# Patient Record
Sex: FEMALE | Race: WHITE | NOT HISPANIC OR LATINO | URBAN - METROPOLITAN AREA
[De-identification: names, ages, dates, MRNs, and addresses within clinical notes are randomized per-mention and may not be internally consistent; named-entity substitution may affect disease eponyms.]

---

## 2023-11-02 DIAGNOSIS — I10 ESSENTIAL HYPERTENSION: Primary | ICD-10-CM

## 2023-11-07 RX ORDER — TRIAMTERENE AND HYDROCHLOROTHIAZIDE 75; 50 MG/1; MG/1
1 TABLET ORAL
Qty: 90 TABLET | Refills: 1 | Status: SHIPPED | OUTPATIENT
Start: 2023-11-07

## 2024-04-05 DIAGNOSIS — Z76.0 MEDICATION REFILL: Primary | ICD-10-CM

## 2024-04-09 RX ORDER — BUSPIRONE HYDROCHLORIDE 15 MG/1
15 TABLET ORAL 2 TIMES DAILY
Qty: 180 TABLET | Refills: 0 | Status: SHIPPED | OUTPATIENT
Start: 2024-04-09

## 2024-05-02 DIAGNOSIS — I10 ESSENTIAL HYPERTENSION: ICD-10-CM

## 2024-05-02 RX ORDER — TRIAMTERENE AND HYDROCHLOROTHIAZIDE 75; 50 MG/1; MG/1
1 TABLET ORAL
Qty: 90 TABLET | Refills: 1 | OUTPATIENT
Start: 2024-05-02

## 2024-06-22 DIAGNOSIS — I10 ESSENTIAL HYPERTENSION: ICD-10-CM

## 2024-06-24 NOTE — TELEPHONE ENCOUNTER
Rx request received  Pharmacy populated  Last appmt 3/30/23 for physical. Call placed to patient to schedule annual physical. Patient scheduled for 7/2024

## 2024-06-25 RX ORDER — TRIAMTERENE AND HYDROCHLOROTHIAZIDE 75; 50 MG/1; MG/1
1 TABLET ORAL
Qty: 90 TABLET | Refills: 1 | Status: SHIPPED | OUTPATIENT
Start: 2024-06-25

## 2024-07-10 DIAGNOSIS — Z76.0 MEDICATION REFILL: ICD-10-CM

## 2024-07-10 RX ORDER — BUSPIRONE HYDROCHLORIDE 15 MG/1
15 TABLET ORAL 2 TIMES DAILY
Qty: 180 TABLET | Refills: 0 | Status: SHIPPED | OUTPATIENT
Start: 2024-07-10

## 2024-07-16 ENCOUNTER — APPOINTMENT (OUTPATIENT)
Dept: PRIMARY CARE | Facility: CLINIC | Age: 39
End: 2024-07-16

## 2024-07-22 ENCOUNTER — APPOINTMENT (OUTPATIENT)
Dept: PRIMARY CARE | Facility: CLINIC | Age: 39
End: 2024-07-22
Payer: COMMERCIAL

## 2024-08-12 ENCOUNTER — HOSPITAL ENCOUNTER (OUTPATIENT)
Dept: RADIOLOGY | Facility: HOSPITAL | Age: 39
Discharge: HOME | End: 2024-08-12
Payer: COMMERCIAL

## 2024-08-12 DIAGNOSIS — K50.919 CROHN'S DISEASE, UNSPECIFIED, WITH UNSPECIFIED COMPLICATIONS (MULTI): ICD-10-CM

## 2024-08-28 ENCOUNTER — OFFICE VISIT (OUTPATIENT)
Dept: PRIMARY CARE | Facility: CLINIC | Age: 39
End: 2024-08-28
Payer: COMMERCIAL

## 2024-08-28 VITALS
WEIGHT: 228.2 LBS | HEIGHT: 66 IN | OXYGEN SATURATION: 99 % | SYSTOLIC BLOOD PRESSURE: 130 MMHG | DIASTOLIC BLOOD PRESSURE: 88 MMHG | BODY MASS INDEX: 36.67 KG/M2 | HEART RATE: 79 BPM

## 2024-08-28 DIAGNOSIS — I10 PRIMARY HYPERTENSION: ICD-10-CM

## 2024-08-28 DIAGNOSIS — K50.80 CROHN'S DISEASE OF BOTH SMALL AND LARGE INTESTINE WITHOUT COMPLICATION (MULTI): ICD-10-CM

## 2024-08-28 DIAGNOSIS — Z00.00 ANNUAL PHYSICAL EXAM: Primary | ICD-10-CM

## 2024-08-28 PROBLEM — E55.9 VITAMIN D DEFICIENCY: Status: ACTIVE | Noted: 2024-08-28

## 2024-08-28 PROBLEM — G43.109 MIGRAINE WITH AURA AND WITHOUT STATUS MIGRAINOSUS, NOT INTRACTABLE: Status: ACTIVE | Noted: 2024-08-28

## 2024-08-28 PROBLEM — F41.9 ANXIETY: Status: ACTIVE | Noted: 2024-08-28

## 2024-08-28 PROCEDURE — 3075F SYST BP GE 130 - 139MM HG: CPT | Performed by: FAMILY MEDICINE

## 2024-08-28 PROCEDURE — 3079F DIAST BP 80-89 MM HG: CPT | Performed by: FAMILY MEDICINE

## 2024-08-28 PROCEDURE — 99395 PREV VISIT EST AGE 18-39: CPT | Performed by: FAMILY MEDICINE

## 2024-08-28 PROCEDURE — 1036F TOBACCO NON-USER: CPT | Performed by: FAMILY MEDICINE

## 2024-08-28 PROCEDURE — 3008F BODY MASS INDEX DOCD: CPT | Performed by: FAMILY MEDICINE

## 2024-08-28 RX ORDER — TRAMADOL HYDROCHLORIDE 50 MG/1
100 TABLET ORAL 3 TIMES DAILY
COMMUNITY
Start: 2024-08-20

## 2024-08-28 RX ORDER — DICYCLOMINE HYDROCHLORIDE 20 MG/1
1 TABLET ORAL 4 TIMES DAILY PRN
COMMUNITY

## 2024-08-28 RX ORDER — MINERAL OIL
180 ENEMA (ML) RECTAL EVERY 24 HOURS
COMMUNITY

## 2024-08-28 RX ORDER — PHENTERMINE HYDROCHLORIDE 37.5 MG/1
37.5 TABLET ORAL
COMMUNITY

## 2024-08-28 RX ORDER — GABAPENTIN 300 MG/1
300 CAPSULE ORAL 4 TIMES DAILY
COMMUNITY

## 2024-08-28 RX ORDER — LEVONORGESTREL 52 MG/1
1 INTRAUTERINE DEVICE INTRAUTERINE ONCE
COMMUNITY

## 2024-08-28 RX ORDER — AMOXICILLIN AND CLAVULANATE POTASSIUM 500; 125 MG/1; MG/1
1 TABLET, FILM COATED ORAL 3 TIMES DAILY
COMMUNITY
Start: 2024-08-27 | End: 2024-08-30

## 2024-08-28 RX ORDER — SUMATRIPTAN SUCCINATE 25 MG/1
25 TABLET ORAL ONCE AS NEEDED
COMMUNITY

## 2024-08-28 ASSESSMENT — LIFESTYLE VARIABLES
HOW MANY STANDARD DRINKS CONTAINING ALCOHOL DO YOU HAVE ON A TYPICAL DAY: PATIENT DOES NOT DRINK
SKIP TO QUESTIONS 9-10: 1
HOW OFTEN DO YOU HAVE SIX OR MORE DRINKS ON ONE OCCASION: NEVER
AUDIT-C TOTAL SCORE: 0
HOW OFTEN DO YOU HAVE A DRINK CONTAINING ALCOHOL: NEVER

## 2024-08-28 ASSESSMENT — PAIN SCALES - GENERAL: PAINLEVEL: 8

## 2024-08-28 ASSESSMENT — PATIENT HEALTH QUESTIONNAIRE - PHQ9
1. LITTLE INTEREST OR PLEASURE IN DOING THINGS: NOT AT ALL
2. FEELING DOWN, DEPRESSED OR HOPELESS: NOT AT ALL
SUM OF ALL RESPONSES TO PHQ9 QUESTIONS 1 AND 2: 0

## 2024-08-28 NOTE — PROGRESS NOTES
"History Of Present Illness  Daisha Zelaya \"Justine" is a 39 y.o. female presenting for Annual Exam (physical)  .    HPI   Health maintenance:   Tetanus UTD until  2033.    Last  pap 2018, will see GYN. Interested in getting hysterectomy.    Corhn's flare up based  on endoscopy. Seeing GI, part of  a research study.    Anxiety controlled. New job with less stress.    HTN controlled. Seeing weight loss provider, has lost about 30 lb in past few months on Phentermine. BP has been stable.      Past Medical History  Patient Active Problem List    Diagnosis Date Noted    Anxiety 08/28/2024    Crohn's disease of both small and large intestine without complications (Multi) 08/28/2024    Primary hypertension 08/28/2024    Migraine with aura and without status migrainosus, not intractable 08/28/2024    Vitamin D deficiency 08/28/2024    Post laminectomy syndrome 04/04/2014    Displacement of lumbar intervertebral disc without myelopathy 01/06/2003        Medications  Current Outpatient Medications   Medication Sig Dispense Refill    amoxicillin-pot clavulanate (Augmentin) 500-125 mg tablet Take 1 tablet by mouth 3 times a day.      busPIRone (Buspar) 15 mg tablet TAKE 1 TABLET BY MOUTH TWICE A  tablet 0    dicyclomine (Bentyl) 20 mg tablet Take 1 tablet (20 mg) by mouth 4 times a day as needed.      etrasimod arginine (ETRASIMOD ORAL) Take by mouth.      fexofenadine (Allegra Allergy) 180 mg tablet Take 1 tablet (180 mg) by mouth once every 24 hours.      gabapentin (Neurontin) 300 mg capsule Take 1 capsule (300 mg) by mouth 4 times a day.      levonorgestrel (Mirena) 21 mcg/24 hr (8 yrs) 52 mg IUD 52 mg by intrauterine route 1 time.      phentermine (Adipex-P) 37.5 mg tablet Take 1 tablet (37.5 mg) by mouth once daily in the morning. Take before meals.      SUMAtriptan (Imitrex) 25 mg tablet Take 1 tablet (25 mg) by mouth 1 time if needed for migraine. May repeat dose once in 2 hours if no relief.  Do not exceed 2 " "doses in 24 hours.      traMADol (Ultram) 50 mg tablet Take 2 tablets (100 mg) by mouth 3 times a day.      triamterene-hydrochlorothiazid (Maxzide) 75-50 mg tablet TAKE 1 TABLET BY MOUTH EVERY DAY IN THE MORNING 90 tablet 1     No current facility-administered medications for this visit.        Surgical History  She has a past surgical history that includes Excisional hemorrhoidectomy (2021).     Social History  She reports that she has never smoked. She has never been exposed to tobacco smoke. She has never used smokeless tobacco. She reports that she does not drink alcohol and does not use drugs.    Family History  No family history on file.     Allergies  Lisinopril, Hydrocodone-acetaminophen, Nickel, Other, Pregabalin, Gadolinium-containing contrast media, Iodinated contrast media, and Onion    Immunizations  Immunization History   Administered Date(s) Administered    HPV 9-valent vaccine (GARDASIL 9) 03/30/2023    Hepatitis B vaccine, 19 yrs and under (RECOMBIVAX, ENGERIX) 08/13/2004    Jaguar SARS-CoV-2 Vaccination 03/29/2021    Tdap vaccine, age 7 year and older (BOOSTRIX, ADACEL) 03/30/2023        ROS  Negative, except as discussed in HPI     Vitals  /88   Pulse 79   Ht 1.676 m (5' 6\")   Wt 104 kg (228 lb 3.2 oz)   SpO2 99%   BMI 36.83 kg/m²      Physical Exam  Vitals and nursing note reviewed.   Constitutional:       Appearance: Normal appearance.   HENT:      Head: Normocephalic.      Right Ear: Tympanic membrane normal.      Left Ear: Tympanic membrane normal.      Nose: Nose normal.      Mouth/Throat:      Mouth: Mucous membranes are moist.   Eyes:      Extraocular Movements: Extraocular movements intact.      Conjunctiva/sclera: Conjunctivae normal.      Pupils: Pupils are equal, round, and reactive to light.   Cardiovascular:      Rate and Rhythm: Normal rate and regular rhythm.      Heart sounds: Normal heart sounds.   Pulmonary:      Effort: Pulmonary effort is normal. No respiratory " "distress.      Breath sounds: Normal breath sounds.   Abdominal:      General: Abdomen is flat.      Palpations: Abdomen is soft.      Tenderness: There is no abdominal tenderness.   Musculoskeletal:      Cervical back: Neck supple.   Lymphadenopathy:      Cervical: No cervical adenopathy.   Skin:     General: Skin is warm and dry.      Findings: No rash.   Neurological:      General: No focal deficit present.      Mental Status: She is alert. Mental status is at baseline.      Coordination: Coordination normal.      Gait: Gait normal.      Deep Tendon Reflexes: Reflexes normal.   Psychiatric:         Mood and Affect: Mood normal.         Behavior: Behavior normal.         Relevant Results  Lab Results   Component Value Date    WBC 11.3 (H) 03/11/2022    WBC 10.2 12/16/2021    HGB 13.9 03/11/2022    HGB 13.5 12/16/2021    HCT 41.4 03/11/2022    HCT 43.5 12/16/2021    MCV 82.5 03/11/2022    MCV 86.1 12/16/2021     03/11/2022     12/16/2021     Lab Results   Component Value Date     03/11/2022     12/16/2021    K 4.1 03/11/2022    K 4.1 12/16/2021    CL 95 (L) 03/11/2022    CL 98 12/16/2021    CO2 27 03/11/2022    CO2 23 (L) 12/16/2021    BUN 15 03/11/2022    BUN 17 12/16/2021    CREATININE 0.8 03/11/2022    CREATININE 0.7 12/16/2021    CALCIUM 10.3 03/11/2022    CALCIUM 9.8 12/16/2021    PROT 7.6 03/11/2022    PROT 7.6 12/16/2021    BILITOT 1.3 (H) 03/11/2022    BILITOT 1.4 (H) 12/16/2021    ALKPHOS 109 03/11/2022    ALKPHOS 98 12/16/2021    ALT 53 (H) 03/11/2022    ALT 31 12/16/2021    AST 44 (H) 03/11/2022    AST 32 12/16/2021    GLUCOSE 88 03/11/2022    GLUCOSE 93 12/16/2021     Lab Results   Component Value Date    HGBA1C 5.4 03/28/2019     Lab Results   Component Value Date    TSH 1.05 12/16/2021      Lab Results   Component Value Date    CHOL 230 (H) 12/16/2021    TRIG 146 12/16/2021    HDL 66 12/16/2021           Assessment/Plan   Daisha \"Arlene\" was seen today for annual " exam.  Diagnoses and all orders for this visit:  Annual physical exam (Primary)  -     Comprehensive Metabolic Panel; Future  -     Lipid Panel; Future  -     TSH with reflex to Free T4 if abnormal; Future  -     CBC; Future  -     Hemoglobin A1C; Future  -     Vitamin D 25-Hydroxy,Total (for eval of Vitamin D levels); Future  -     Referral to Gynecology; Future  Crohn's disease of both small and large intestine without complication (Multi)  Comments:  sees GI, Dr. Dawn  Primary hypertension  Comments:  at goal     Follow up in 6 months for HTN.    Counseling:   Medication education:   -Education:  The patient is counseled regarding potential side-effects of any and all new medications  -Understanding:  Patient expressed understanding of information discussed today  -Adherence:  No barriers to adherence identified    Final treatment plan is a result of shared decision making with patient.         William Cedillo MD

## 2024-08-30 ENCOUNTER — HOSPITAL ENCOUNTER (EMERGENCY)
Facility: HOSPITAL | Age: 39
Discharge: HOME | End: 2024-08-30
Payer: COMMERCIAL

## 2024-08-30 ENCOUNTER — APPOINTMENT (OUTPATIENT)
Dept: RADIOLOGY | Facility: HOSPITAL | Age: 39
End: 2024-08-30
Payer: COMMERCIAL

## 2024-08-30 VITALS
BODY MASS INDEX: 36.32 KG/M2 | HEART RATE: 96 BPM | RESPIRATION RATE: 15 BRPM | OXYGEN SATURATION: 99 % | TEMPERATURE: 97.9 F | WEIGHT: 226 LBS | HEIGHT: 66 IN

## 2024-08-30 DIAGNOSIS — M65.9 TENOSYNOVITIS OF FINGER: Primary | ICD-10-CM

## 2024-08-30 PROCEDURE — 73130 X-RAY EXAM OF HAND: CPT | Mod: LT

## 2024-08-30 PROCEDURE — 99284 EMERGENCY DEPT VISIT MOD MDM: CPT

## 2024-08-30 PROCEDURE — 2500000001 HC RX 250 WO HCPCS SELF ADMINISTERED DRUGS (ALT 637 FOR MEDICARE OP): Performed by: PHYSICIAN ASSISTANT

## 2024-08-30 PROCEDURE — 2500000002 HC RX 250 W HCPCS SELF ADMINISTERED DRUGS (ALT 637 FOR MEDICARE OP, ALT 636 FOR OP/ED): Performed by: PHYSICIAN ASSISTANT

## 2024-08-30 PROCEDURE — 73130 X-RAY EXAM OF HAND: CPT | Mod: LEFT SIDE | Performed by: RADIOLOGY

## 2024-08-30 RX ORDER — AMOXICILLIN AND CLAVULANATE POTASSIUM 875; 125 MG/1; MG/1
1 TABLET, FILM COATED ORAL EVERY 12 HOURS
Qty: 20 TABLET | Refills: 0 | Status: SHIPPED | OUTPATIENT
Start: 2024-08-30 | End: 2024-09-09

## 2024-08-30 RX ORDER — KETOROLAC TROMETHAMINE 10 MG/1
10 TABLET, FILM COATED ORAL ONCE
Status: COMPLETED | OUTPATIENT
Start: 2024-08-30 | End: 2024-08-30

## 2024-08-30 RX ORDER — SULFAMETHOXAZOLE AND TRIMETHOPRIM 800; 160 MG/1; MG/1
2 TABLET ORAL 2 TIMES DAILY
Qty: 40 TABLET | Refills: 0 | Status: SHIPPED | OUTPATIENT
Start: 2024-08-30 | End: 2024-09-09

## 2024-08-30 RX ORDER — IBUPROFEN 600 MG/1
600 TABLET ORAL EVERY 6 HOURS PRN
Qty: 30 TABLET | Refills: 0 | Status: SHIPPED | OUTPATIENT
Start: 2024-08-30

## 2024-08-30 RX ORDER — SULFAMETHOXAZOLE AND TRIMETHOPRIM 800; 160 MG/1; MG/1
2 TABLET ORAL ONCE
Status: COMPLETED | OUTPATIENT
Start: 2024-08-30 | End: 2024-08-30

## 2024-08-30 RX ORDER — AMOXICILLIN AND CLAVULANATE POTASSIUM 875; 125 MG/1; MG/1
1 TABLET, FILM COATED ORAL ONCE
Status: DISCONTINUED | OUTPATIENT
Start: 2024-08-30 | End: 2024-08-30

## 2024-08-30 ASSESSMENT — PAIN DESCRIPTION - DESCRIPTORS: DESCRIPTORS: ACHING;RADIATING;SHOOTING

## 2024-08-30 ASSESSMENT — COLUMBIA-SUICIDE SEVERITY RATING SCALE - C-SSRS
2. HAVE YOU ACTUALLY HAD ANY THOUGHTS OF KILLING YOURSELF?: NO
6. HAVE YOU EVER DONE ANYTHING, STARTED TO DO ANYTHING, OR PREPARED TO DO ANYTHING TO END YOUR LIFE?: NO
1. IN THE PAST MONTH, HAVE YOU WISHED YOU WERE DEAD OR WISHED YOU COULD GO TO SLEEP AND NOT WAKE UP?: NO

## 2024-08-30 ASSESSMENT — PAIN DESCRIPTION - ONSET: ONSET: ONGOING

## 2024-08-30 ASSESSMENT — PAIN DESCRIPTION - PAIN TYPE: TYPE: ACUTE PAIN

## 2024-08-30 ASSESSMENT — PAIN - FUNCTIONAL ASSESSMENT: PAIN_FUNCTIONAL_ASSESSMENT: 0-10

## 2024-08-30 ASSESSMENT — PAIN DESCRIPTION - DIRECTION: RADIATING_TOWARDS: WRIST

## 2024-08-30 ASSESSMENT — PAIN DESCRIPTION - LOCATION: LOCATION: FINGER (COMMENT WHICH ONE)

## 2024-08-30 ASSESSMENT — PAIN DESCRIPTION - PROGRESSION: CLINICAL_PROGRESSION: NOT CHANGED

## 2024-08-30 ASSESSMENT — PAIN DESCRIPTION - ORIENTATION: ORIENTATION: LEFT

## 2024-08-30 ASSESSMENT — PAIN SCALES - GENERAL: PAINLEVEL_OUTOF10: 9

## 2024-08-30 ASSESSMENT — PAIN DESCRIPTION - FREQUENCY: FREQUENCY: CONSTANT/CONTINUOUS

## 2024-08-30 NOTE — ED TRIAGE NOTES
Woke up and couldn't move index finger. Took ibuprofen and iced it and its getting worse.. Can't move the finger at all now. No injury

## 2024-08-30 NOTE — DISCHARGE INSTRUCTIONS
Please take Augmentin and Bactrim as prescribed.  You may use ibuprofen as well for pain.    Please soak your left hand in warm soapy water 3 times daily for 20 minutes at a time.    If you begin to have worsening redness, swelling, pain, you need to return to the emergency department immediately.    Call orthopedics listed in your discharge paperwork on Tuesday to schedule a follow-up appointment.

## 2024-08-30 NOTE — ED PROVIDER NOTES
HPI   Chief Complaint   Patient presents with    Finger swelling       Is a 39-year-old female presenting to the emergency department for complaints of left index finger swelling.  Patient states that she woke up this morning and had swelling and pain of her left index finger.  She also has bruising.  She denies any trauma or mechanism of injury to her left index finger.  She was not drinking alcohol to lead to a injury that she did not recall.  She denies any recent abrasions to the left index finger.  She is currently on Augmentin for a tooth infection.  Patient has pain trying to flex and extend to her left index finger.  There is no pain in any of her other digits of the left hand.  No pain around the left hand or the left wrist.  No fevers or chills.  No history of diabetes.      Please see HPI for pertinent positive and negative ROS.         Patient History   No past medical history on file.  Past Surgical History:   Procedure Laterality Date    EXCISIONAL HEMORRHOIDECTOMY  2021     No family history on file.  Social History     Tobacco Use    Smoking status: Never     Passive exposure: Never    Smokeless tobacco: Never   Vaping Use    Vaping status: Never Used   Substance Use Topics    Alcohol use: Never    Drug use: Never     Comment: CBD EDIBLES TO HELP WITH NAUSEA       Physical Exam   ED Triage Vitals [08/30/24 1749]   Temperature Heart Rate Respirations BP   36.6 °C (97.9 °F) 96 15 --      Pulse Ox Temp Source Heart Rate Source Patient Position   99 % Oral Monitor Sitting      BP Location FiO2 (%)     Right arm --       Physical Exam  GENERAL APPEARANCE: This patient is in no acute respiratory distress. Awake and alert, talking appropriately. Answering questions appropriately. No evidence of pressured speech  VITAL SIGNS: As per the nurses' triage record.  HEENT: Normocephalic, atraumatic.  NECK:  full gross ROM during exam  MUSCULOSKELETAL: Swelling of the left index finger from MCP joint to the distal  aspect of the finger.  There is diffuse swelling and erythema more prominent on the flexor side than the extensor side of left index finger.  Patient does have very limited flexion and extension of the left index finger.  There is pain with attempting to perform active flexion and extension.  Brisk capillary refill.  Sensation is intact over median, radial and ulnar nerve distributions of left hand.  No overlying abrasions or cuts in the skin.  No nail involvement.  Ambulating on own with no acute difficulties  NEUROLOGICAL: Awake, alert and oriented x 3.  IMMUNOLOGICAL: No palpable lymphadenopathy or lymphatic streaking noted on visible skin.  DERM: No petechiae, rashes, or ecchymoses on visible skin  PSYCH: mood and affect appear normal.      ED Course & MDM   Diagnoses as of 08/30/24 1959   Tenosynovitis of finger                 No data recorded     [place] Coma Scale Score: 15 (08/30/24 1952 : Denita Gasca RN)                           Medical Decision Making  Parts of this chart have been completed using voice recognition software. Please excuse any errors of transcription.  My thought process and reason for plan has been formulated from the time that I saw the patient until the time of disposition and is not specific to one specific moment during their visit and furthermore my MDM encompasses this entire chart and not only this text box.      HPI: Detailed above.    Exam: A medically appropriate exam performed, outlined above, given the known history and presentation.    History obtained from: Patient    Medications given during visit:  Medications   sulfamethoxazole-trimethoprim (Bactrim DS) 800-160 mg per tablet 2 tablet (2 tablets oral Given 8/30/24 1949)   ketorolac (Toradol) tablet 10 mg (10 mg oral Given 8/30/24 1949)        Diagnostic/tests  Labs Reviewed - No data to display   XR hand left 3+ views   Final Result   No acute osseous abnormality.             MACRO:   None        Signed by: Tucker  Isackofi 8/30/2024 6:55 PM   Dictation workstation:   [id number]           Considerations/further MDM:    Differential diagnosis includes was not limited to flexor tenosynovitis versus cellulitis versus fracture versus Dupuytren's contracture    Physical exam findings is consistent with tenosynovitis.  X-ray of the left hand shows no acute osseous abnormality.  I did discuss this case with my supervising physician, Dr. Monroe.  I also consulted orthopedic on-call, Dr. Russo.  I did thoroughly go over history and physical exam findings.  He states he would continue the Augmentin and add oral Bactrim with very strict return precautions if it worsens.  He also recommended warm soaks 3 times daily.    Patient did take Augmentin 500-125 times daily today.  Therefore I did not give her any more Augmentin.  She was given 2 Bactrim tablets.  Warm soak with Hibiclens in the emergency department x 20 minutes.  Patient was given a new prescription for Augmentin twice daily for 10 days along with Bactrim double strength twice daily for 10 days.  She was told to use ibuprofen that was prescribed for pain.  She was educated to do warm soapy water hand soaks 20 minutes 3 times daily.  She was given very strict return precautions including spreading redness, warmth, swelling or increasing pain.  She verbalized understanding and did feel comfortable discharge plan home with close outpatient follow-up or return to the emergency department if symptoms and signs worsen despite oral outpatient antibiotics.  All of her questions were answered.  Of note, the patient did not want a pregnancy test.  I did tell her that the medication she is taking could be damaging to fetus if she was pregnant but she states there is no chance of pregnancy.    Patient was discharged in stable condition.      Procedure  Procedures     Trish Inman PA-C  08/30/24 1959

## 2024-08-30 NOTE — TREATMENT PLAN
"I discussed this patient in conjunction with the advanced practicing provider but did not personally obtain any history or perform any assessment/exam of the patient.      Pulse 96   Temp 36.6 °C (97.9 °F) (Oral)   Resp 15   Ht 1.676 m (5' 6\")   Wt 103 kg (226 lb)   SpO2 99%   BMI 36.48 kg/m²     Diagnoses as of 08/31/24 1114   Tenosynovitis of finger       I reviewed the VS and obtained workup results of the patient.  I reviewed plan of care with advanced practice provider and agree with their assessment/evaluation along alvina overall workup and treatment plan.  Final decision making will be made by advanced practice provider unless requested to assess/evaluate the patient and provide further recommendations or adjustments to established plan of care.    "

## 2024-09-04 ENCOUNTER — TELEPHONE (OUTPATIENT)
Dept: PRIMARY CARE | Facility: CLINIC | Age: 39
End: 2024-09-04
Payer: COMMERCIAL

## 2024-09-04 NOTE — TELEPHONE ENCOUNTER
Patient declines. Patient wants sooner appmt. Offered appmt Monday (Same day appmt slot) Patient states she can't as she has dental appmt (Follow up from oral abscess.) Patient states she is concerned and ER did not do much for her. Patient states her Crohn's disease is out of remission and isn't sure if this is related/causing this. Patient states ER did not do any labs, imaging & states she was dx with a finger infection while already on ABT for oral abscess. Patient requesting to be seen sooner and wants to see office NP. Please advise if willing to see patient for ER follow up

## 2024-09-09 ENCOUNTER — APPOINTMENT (OUTPATIENT)
Dept: RADIOLOGY | Facility: HOSPITAL | Age: 39
End: 2024-09-09
Payer: COMMERCIAL

## 2024-09-09 DIAGNOSIS — K50.013 CROHN'S DISEASE OF SMALL INTESTINE WITH FISTULA (MULTI): Primary | ICD-10-CM

## 2024-09-11 ENCOUNTER — HOSPITAL ENCOUNTER (OUTPATIENT)
Dept: RADIOLOGY | Facility: HOSPITAL | Age: 39
Discharge: HOME | End: 2024-09-11
Payer: COMMERCIAL

## 2024-09-11 ENCOUNTER — LAB (OUTPATIENT)
Dept: LAB | Facility: LAB | Age: 39
End: 2024-09-11
Payer: COMMERCIAL

## 2024-09-11 DIAGNOSIS — K50.919 CROHN'S DISEASE, UNSPECIFIED, WITH UNSPECIFIED COMPLICATIONS (MULTI): ICD-10-CM

## 2024-09-11 DIAGNOSIS — K50.013 CROHN'S DISEASE OF SMALL INTESTINE WITH FISTULA (MULTI): Primary | ICD-10-CM

## 2024-09-11 LAB
CREAT SERPL-MCNC: 0.8 MG/DL (ref 0.4–1.6)
EGFRCR SERPLBLD CKD-EPI 2021: >90 ML/MIN/1.73M*2

## 2024-09-11 PROCEDURE — 2500000005 HC RX 250 GENERAL PHARMACY W/O HCPCS: Performed by: INTERNAL MEDICINE

## 2024-09-11 PROCEDURE — 36415 COLL VENOUS BLD VENIPUNCTURE: CPT

## 2024-09-11 PROCEDURE — 74177 CT ABD & PELVIS W/CONTRAST: CPT

## 2024-09-11 PROCEDURE — 82565 ASSAY OF CREATININE: CPT

## 2024-09-11 PROCEDURE — 2550000001 HC RX 255 CONTRASTS: Performed by: INTERNAL MEDICINE

## 2024-09-12 ENCOUNTER — OFFICE VISIT (OUTPATIENT)
Dept: PRIMARY CARE | Facility: CLINIC | Age: 39
End: 2024-09-12
Payer: COMMERCIAL

## 2024-09-12 VITALS
SYSTOLIC BLOOD PRESSURE: 124 MMHG | TEMPERATURE: 97.1 F | OXYGEN SATURATION: 99 % | WEIGHT: 227 LBS | DIASTOLIC BLOOD PRESSURE: 86 MMHG | HEIGHT: 66 IN | RESPIRATION RATE: 18 BRPM | BODY MASS INDEX: 36.48 KG/M2 | HEART RATE: 81 BPM

## 2024-09-12 DIAGNOSIS — M79.642 HAND PAIN, LEFT: ICD-10-CM

## 2024-09-12 DIAGNOSIS — L08.9 FINGER INFECTION: Primary | ICD-10-CM

## 2024-09-12 PROCEDURE — 3074F SYST BP LT 130 MM HG: CPT | Performed by: NURSE PRACTITIONER

## 2024-09-12 PROCEDURE — 1036F TOBACCO NON-USER: CPT | Performed by: NURSE PRACTITIONER

## 2024-09-12 PROCEDURE — 3079F DIAST BP 80-89 MM HG: CPT | Performed by: NURSE PRACTITIONER

## 2024-09-12 PROCEDURE — 99213 OFFICE O/P EST LOW 20 MIN: CPT | Performed by: NURSE PRACTITIONER

## 2024-09-12 PROCEDURE — 3008F BODY MASS INDEX DOCD: CPT | Performed by: NURSE PRACTITIONER

## 2024-09-12 RX ORDER — AMOXICILLIN 500 MG/1
500 CAPSULE ORAL 3 TIMES DAILY
COMMUNITY
Start: 2024-09-09

## 2024-09-12 RX ORDER — IBUPROFEN 800 MG/1
800 TABLET ORAL EVERY 6 HOURS PRN
COMMUNITY
Start: 2024-09-09

## 2024-09-12 ASSESSMENT — ENCOUNTER SYMPTOMS
CARDIOVASCULAR NEGATIVE: 1
RESPIRATORY NEGATIVE: 1
MUSCULOSKELETAL NEGATIVE: 1
GASTROINTESTINAL NEGATIVE: 1
CONSTITUTIONAL NEGATIVE: 1

## 2024-09-12 ASSESSMENT — PATIENT HEALTH QUESTIONNAIRE - PHQ9
1. LITTLE INTEREST OR PLEASURE IN DOING THINGS: NOT AT ALL
SUM OF ALL RESPONSES TO PHQ9 QUESTIONS 1 AND 2: 0
2. FEELING DOWN, DEPRESSED OR HOPELESS: NOT AT ALL

## 2024-09-12 ASSESSMENT — PAIN SCALES - GENERAL: PAINLEVEL: 4

## 2024-09-12 NOTE — PROGRESS NOTES
"Chief Complaint  Daisha Zelaya \"Justine" is a 39 y.o. female presenting for \"Finger Pain (Has been having finger pain after starting atb prescribed by dentist. Woke up with finger swelling discoloration and unable to move finger. Pt went to er and was told she has an infection but was not getting more answers. She was told to stop the atb from dentist and started on new atb. Was told if pain and swelling continues to go back to er, pt wanted to be seen in primary care. Currently finger still painful and swollen).\"    HPI     Daisha Zelaya \"Justine" is a 39 y.o. female presenting for finger pain, was given amoxicillin and bactrim, swelling and discoloration has gone done but still having some pain.   Infection looks resolved.        Past Medical History  Patient Active Problem List    Diagnosis Date Noted    Anxiety 08/28/2024    Crohn's disease of both small and large intestine without complications (Multi) 08/28/2024    Primary hypertension 08/28/2024    Migraine with aura and without status migrainosus, not intractable 08/28/2024    Vitamin D deficiency 08/28/2024    Post laminectomy syndrome 04/04/2014    Displacement of lumbar intervertebral disc without myelopathy 01/06/2003        Medications  Current Outpatient Medications   Medication Instructions    amoxicillin (AMOXIL) 500 mg, oral, 3 times daily    busPIRone (BUSPAR) 15 mg, oral, 2 times daily    dicyclomine (Bentyl) 20 mg tablet 1 tablet, oral, 4 times daily PRN    etrasimod arginine (ETRASIMOD ORAL) oral    fexofenadine (ALLEGRA ALLERGY) 180 mg, oral, Every 24 hours    gabapentin (NEURONTIN) 300 mg, oral, 4 times daily    ibuprofen 800 mg, oral, Every 6 hours PRN    levonorgestrel (Mirena) 21 mcg/24 hr (8 yrs) 52 mg IUD 1 each, intrauterine, Once    phentermine (ADIPEX-P) 37.5 mg, oral, Daily before breakfast    SUMAtriptan (IMITREX) 25 mg, oral, Once as needed, May repeat dose once in 2 hours if no relief.  Do not exceed 2 doses in 24 hours.    " "traMADol (ULTRAM) 100 mg, oral, 3 times daily    triamterene-hydrochlorothiazid (Maxzide) 75-50 mg tablet 1 tablet, oral, Daily before breakfast        Surgical History  She has a past surgical history that includes Excisional hemorrhoidectomy (2021).     Social History  She reports that she has never smoked. She has never been exposed to tobacco smoke. She has never used smokeless tobacco. She reports that she does not drink alcohol and does not use drugs.    Family History  No family history on file.     Allergies  Barium sulfate, Lisinopril, Hydrocodone-acetaminophen, Nickel, Other, Pregabalin, Gadolinium-containing contrast media, Iodinated contrast media, and Onion    ROS  Review of Systems   Constitutional: Negative.    Respiratory: Negative.     Cardiovascular: Negative.    Gastrointestinal: Negative.    Genitourinary: Negative.    Musculoskeletal: Negative.         Last Recorded Vitals  /86 (BP Location: Left arm, Patient Position: Sitting, BP Cuff Size: Adult)   Pulse 81   Temp 36.2 °C (97.1 °F)   Resp 18   Wt 103 kg (227 lb)   SpO2 99%     Physical Exam  Vitals and nursing note reviewed.   Constitutional:       Appearance: Normal appearance.   Cardiovascular:      Rate and Rhythm: Normal rate and regular rhythm.      Pulses: Normal pulses.      Heart sounds: Normal heart sounds.   Pulmonary:      Effort: Pulmonary effort is normal.      Breath sounds: Normal breath sounds.   Neurological:      Mental Status: She is alert.         Relevant Results      Assessment/Plan   Daisha \"Justine" was seen today for finger pain.  Diagnoses and all orders for this visit:  Finger infection (Primary)  Hand pain, left   Healing well        COUNSELING      Medication education:              Education:  The patient is counseled regarding potential side-effects of any and all new medications             Understanding:  Patient expressed understanding             Adherence:  No barriers to adherence " identified        Cora Weiss, APRN-CNP

## 2024-09-19 ENCOUNTER — TELEPHONE (OUTPATIENT)
Dept: PRIMARY CARE | Facility: CLINIC | Age: 39
End: 2024-09-19
Payer: COMMERCIAL

## 2024-09-19 NOTE — TELEPHONE ENCOUNTER
PT IN OFFICE FOR ER FU LAST WEEK WAS ON ANTIBIOITICS FOR FINGER OFF ANTIBIOTICS LT POINTER FINGER IS PAINFUL AND SWELLING REQUESTING LAB WORK TO BE DONE PT IS TRAVELING OUT OF COUNTRY IN 2 WEEKS AND IS ASKING ABOUT ANY IMMUNIZATIONS SHE MAY NEED TO [facility]

## 2024-09-25 ENCOUNTER — LAB (OUTPATIENT)
Dept: LAB | Facility: LAB | Age: 39
End: 2024-09-25
Payer: COMMERCIAL

## 2024-09-25 DIAGNOSIS — Z00.00 ANNUAL PHYSICAL EXAM: ICD-10-CM

## 2024-09-25 LAB
25(OH)D3 SERPL-MCNC: 18 NG/ML (ref 30–100)
ALBUMIN SERPL BCP-MCNC: 4.2 G/DL (ref 3.4–5)
ALP SERPL-CCNC: 99 U/L (ref 33–110)
ALT SERPL W P-5'-P-CCNC: 44 U/L (ref 7–45)
ANION GAP SERPL CALCULATED.3IONS-SCNC: 14 MMOL/L (ref 10–20)
AST SERPL W P-5'-P-CCNC: 28 U/L (ref 9–39)
BILIRUB SERPL-MCNC: 1.3 MG/DL (ref 0–1.2)
BUN SERPL-MCNC: 16 MG/DL (ref 6–23)
CALCIUM SERPL-MCNC: 9.6 MG/DL (ref 8.6–10.3)
CHLORIDE SERPL-SCNC: 97 MMOL/L (ref 98–107)
CHOLEST SERPL-MCNC: 221 MG/DL (ref 0–199)
CHOLEST/HDLC SERPL: 4.1 {RATIO}
CO2 SERPL-SCNC: 30 MMOL/L (ref 21–32)
CREAT SERPL-MCNC: 0.67 MG/DL (ref 0.5–1.05)
EGFRCR SERPLBLD CKD-EPI 2021: >90 ML/MIN/1.73M*2
ERYTHROCYTE [DISTWIDTH] IN BLOOD BY AUTOMATED COUNT: 14.1 % (ref 11.5–14.5)
EST. AVERAGE GLUCOSE BLD GHB EST-MCNC: 91 MG/DL
GLUCOSE SERPL-MCNC: 87 MG/DL (ref 74–99)
HBA1C MFR BLD: 4.8 %
HCT VFR BLD AUTO: 38.9 % (ref 36–46)
HDLC SERPL-MCNC: 53.7 MG/DL
HGB BLD-MCNC: 12.5 G/DL (ref 12–16)
LDLC SERPL CALC-MCNC: 130 MG/DL
MCH RBC QN AUTO: 27.2 PG (ref 26–34)
MCHC RBC AUTO-ENTMCNC: 32.1 G/DL (ref 32–36)
MCV RBC AUTO: 85 FL (ref 80–100)
NON HDL CHOLESTEROL: 167 MG/DL (ref 0–149)
NRBC BLD-RTO: 0 /100 WBCS (ref 0–0)
PLATELET # BLD AUTO: 401 X10*3/UL (ref 150–450)
POTASSIUM SERPL-SCNC: 3.7 MMOL/L (ref 3.5–5.3)
PROT SERPL-MCNC: 6.8 G/DL (ref 6.4–8.2)
RBC # BLD AUTO: 4.6 X10*6/UL (ref 4–5.2)
SODIUM SERPL-SCNC: 137 MMOL/L (ref 136–145)
TRIGL SERPL-MCNC: 188 MG/DL (ref 0–149)
TSH SERPL-ACNC: 1.18 MIU/L (ref 0.44–3.98)
VLDL: 38 MG/DL (ref 0–40)
WBC # BLD AUTO: 8.6 X10*3/UL (ref 4.4–11.3)

## 2024-09-25 PROCEDURE — 83036 HEMOGLOBIN GLYCOSYLATED A1C: CPT

## 2024-09-25 PROCEDURE — 82306 VITAMIN D 25 HYDROXY: CPT

## 2024-09-25 PROCEDURE — 85027 COMPLETE CBC AUTOMATED: CPT

## 2024-09-25 PROCEDURE — 80053 COMPREHEN METABOLIC PANEL: CPT

## 2024-09-25 PROCEDURE — 80061 LIPID PANEL: CPT

## 2024-09-25 PROCEDURE — 84443 ASSAY THYROID STIM HORMONE: CPT

## 2024-09-25 PROCEDURE — 36415 COLL VENOUS BLD VENIPUNCTURE: CPT

## 2024-10-07 ENCOUNTER — APPOINTMENT (OUTPATIENT)
Dept: ORTHOPEDIC SURGERY | Facility: CLINIC | Age: 39
End: 2024-10-07
Payer: COMMERCIAL

## 2024-10-07 NOTE — PROGRESS NOTES
Subjective      No chief complaint on file.       No surgery found     HPI  ***    CARDIOLOGY:   Negative for chest pain, shortness of breath.   RESPIRATORY:   Negative for chest pain, shortness of breath.   MUSCULOSKELETAL:   See HPI for details.   NEUROLOGY:   Negative for tingling, numbness, weakness.    Objective    There were no vitals filed for this visit.    Physical Exam  GENERAL:          General Appearance:  This is a pleasant patient with appropriate affect, in no acute distress.   DERMATOLOGY:          Skin: skin at the neck, upper and lower back, and trunk is intact. There is no evidence of skin rash, skin breakdown or ulceration, or atrophic skin change.   EXTREMITIES:          Vascular:  Right, left hands and feet are warm with good color and pulses. Right and left calf and thigh are nontender and nonswollen.   NEUROLOGICAL:          Orientation:  Patient is alert and oriented to person, place, time and situation. Right and left upper and lower extremity motor and sensory examinations are intact.      MUSCULOSKELETAL: Neck: Nontender. No pain with range of motion. ***    CT enterography w contrast    Result Date: 9/17/2024  Interpreted By:  Janett Sorto, STUDY: CT ENTEROGRAPHY WITH  IV CONTRAST;  9/11/2024 6:39 pm   INDICATION: 38 y/o   F with  Signs/Symptoms:Crohn's disease with complication, unspecified gi tract location.   LIMITATIONS: None.   ACCESSION NUMBER(S): [id number]   ORDERING CLINICIAN: DIONISIO SOLER   TECHNIQUE: After the administration of IV iodonated contrast, spiral axial images were obtained from the xiphoid down through the symphysis pubis. Sagittal and coronal reconstruction images were generated.   COMPARISON: None.   FINDINGS: Lower Chest: Clear.   Liver: The liver is unremarkable without focal lesion.   Gallbladder and Biliary: Unremarkable.   Pancreas: No abnormality identified in the pancreas.   Spleen: No abnormality identified in the spleen.   Adrenals: No abnormality  identified in either adrenal gland.   Urinary: Duplicated right ureter. Right renal cyst measuring 1.5 cm. No hydronephrosis.   Reproductive: Intrauterine device.   Gastrointestinal/Peritoneum: No small or large bowel obstruction in the visualized abdomen. In the abdomen, there is no extraluminal air. No significant free fluid. The terminal ileum is underdistended limiting its evaluation. There is perienteric stranding involving the distal most 6 cm of the terminal ileum suggestive of terminal ileitis. Relative to the remainder of the underdistended bowel loops there is also likely mild bowel wall thickening in this region. Appendix not seen presumably surgically absent.   Vascular: Abdominal aorta is normal in caliber. Atherosclerosis.   Lymphatics: Mild charlie hepatis lymphadenopathy is favored reactive.   MSK/Body Wall: No aggressive bony lesion identified. Degenerative changes at L5-S1. Small fat containing umbilical hernia.       Terminal ileitis. No evidence of bowel obstruction.   Signed by: Janett Sorto 9/17/2024 1:56 PM Dictation workstation:   [id number]       There are no diagnoses linked to this encounter.     Sabrina Cintron, ATC

## 2024-10-15 ENCOUNTER — APPOINTMENT (OUTPATIENT)
Dept: ORTHOPEDIC SURGERY | Facility: CLINIC | Age: 39
End: 2024-10-15
Payer: COMMERCIAL

## 2024-11-12 PROBLEM — R63.1 POLYDIPSIA: Status: ACTIVE | Noted: 2024-11-12

## 2024-11-12 PROBLEM — G47.10 HYPERSOMNIA: Status: ACTIVE | Noted: 2024-11-12

## 2024-11-12 PROBLEM — K57.92 DIVERTICULITIS: Status: ACTIVE | Noted: 2024-11-12
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
## 2024-11-12 PROBLEM — F32.1 MODERATE MAJOR DEPRESSION (MULTI): Status: ACTIVE | Noted: 2024-11-12

## 2024-11-12 PROBLEM — M79.606 PAIN OF LOWER EXTREMITY: Status: ACTIVE | Noted: 2024-11-12

## 2024-11-12 PROBLEM — K43.0 OBSTRUCTED INCISIONAL HERNIA: Status: ACTIVE | Noted: 2024-11-12

## 2024-11-12 PROBLEM — M54.2 NECK PAIN: Status: ACTIVE | Noted: 2024-11-12

## 2024-11-12 PROBLEM — M54.50 ACUTE LOW BACK PAIN: Status: ACTIVE | Noted: 2024-11-12

## 2024-11-12 PROBLEM — Z72.0 TOBACCO USE: Status: ACTIVE | Noted: 2024-11-12

## 2024-11-12 PROBLEM — S39.012A BACK STRAIN: Status: ACTIVE | Noted: 2024-11-12

## 2024-11-12 PROBLEM — K64.9 HEMORRHOIDS: Status: ACTIVE | Noted: 2024-11-12

## 2024-11-12 PROBLEM — K50.018 CROHN'S DISEASE OF SMALL INTESTINE WITH OTHER COMPLICATION: Status: ACTIVE | Noted: 2024-08-28

## 2024-11-12 PROBLEM — E66.9 OBESITY WITH BODY MASS INDEX 30 OR GREATER: Status: ACTIVE | Noted: 2024-11-12

## 2024-11-12 PROBLEM — N94.6 DYSMENORRHEA: Status: ACTIVE | Noted: 2024-11-12

## 2024-11-26 ENCOUNTER — OFFICE VISIT (OUTPATIENT)
Dept: SURGERY | Facility: CLINIC | Age: 39
End: 2024-11-26
Payer: COMMERCIAL

## 2024-11-26 VITALS
HEIGHT: 66 IN | WEIGHT: 214 LBS | HEART RATE: 81 BPM | TEMPERATURE: 98 F | OXYGEN SATURATION: 99 % | DIASTOLIC BLOOD PRESSURE: 80 MMHG | BODY MASS INDEX: 34.39 KG/M2 | SYSTOLIC BLOOD PRESSURE: 140 MMHG

## 2024-11-26 DIAGNOSIS — K50.913 PERIANAL FISTULA DUE TO CROHN'S DISEASE: ICD-10-CM

## 2024-11-26 DIAGNOSIS — K60.30 PERIANAL FISTULA DUE TO CROHN'S DISEASE: ICD-10-CM

## 2024-11-26 DIAGNOSIS — K50.113 CROHN'S DISEASE OF COLON WITH FISTULA (MULTI): Primary | ICD-10-CM

## 2024-11-26 PROCEDURE — 3008F BODY MASS INDEX DOCD: CPT | Performed by: STUDENT IN AN ORGANIZED HEALTH CARE EDUCATION/TRAINING PROGRAM

## 2024-11-26 PROCEDURE — 3077F SYST BP >= 140 MM HG: CPT | Performed by: STUDENT IN AN ORGANIZED HEALTH CARE EDUCATION/TRAINING PROGRAM

## 2024-11-26 PROCEDURE — 46600 DIAGNOSTIC ANOSCOPY SPX: CPT | Performed by: STUDENT IN AN ORGANIZED HEALTH CARE EDUCATION/TRAINING PROGRAM

## 2024-11-26 PROCEDURE — 99203 OFFICE O/P NEW LOW 30 MIN: CPT | Performed by: STUDENT IN AN ORGANIZED HEALTH CARE EDUCATION/TRAINING PROGRAM

## 2024-11-26 PROCEDURE — 99213 OFFICE O/P EST LOW 20 MIN: CPT | Performed by: STUDENT IN AN ORGANIZED HEALTH CARE EDUCATION/TRAINING PROGRAM

## 2024-11-26 PROCEDURE — 3079F DIAST BP 80-89 MM HG: CPT | Performed by: STUDENT IN AN ORGANIZED HEALTH CARE EDUCATION/TRAINING PROGRAM

## 2024-11-26 ASSESSMENT — ENCOUNTER SYMPTOMS
SORE THROAT: 0
VOMITING: 0
PALPITATIONS: 0
ADENOPATHY: 0
ARTHRALGIAS: 0
BLOOD IN STOOL: 0
CHILLS: 0
NAUSEA: 0
CHEST TIGHTNESS: 0
DIARRHEA: 0
FACIAL ASYMMETRY: 0
SHORTNESS OF BREATH: 0
DYSURIA: 0
HEADACHES: 0
ABDOMINAL PAIN: 0
SPEECH DIFFICULTY: 0
BRUISES/BLEEDS EASILY: 0
FEVER: 0
UNEXPECTED WEIGHT CHANGE: 0
HEMATURIA: 0
VOICE CHANGE: 0
TROUBLE SWALLOWING: 0
WOUND: 0

## 2024-11-26 ASSESSMENT — PATIENT HEALTH QUESTIONNAIRE - PHQ9
2. FEELING DOWN, DEPRESSED OR HOPELESS: SEVERAL DAYS
1. LITTLE INTEREST OR PLEASURE IN DOING THINGS: NOT AT ALL
10. IF YOU CHECKED OFF ANY PROBLEMS, HOW DIFFICULT HAVE THESE PROBLEMS MADE IT FOR YOU TO DO YOUR WORK, TAKE CARE OF THINGS AT HOME, OR GET ALONG WITH OTHER PEOPLE: SOMEWHAT DIFFICULT
SUM OF ALL RESPONSES TO PHQ9 QUESTIONS 1 AND 2: 1

## 2024-11-26 ASSESSMENT — PAIN SCALES - GENERAL: PAINLEVEL_OUTOF10: 0-NO PAIN

## 2024-11-26 NOTE — PROGRESS NOTES
History Of Present Illness  Arlene Zelaya is a 39 y.o. female presenting for evaluation of perianal concerns with a history of Crohn's.  She has had a previous ileocecectomy.  She has been on study medications for Crohn's and stopped the medication 3 weeks ago.  She is now going to start another study but has to wait a period of time before starting the new medication.  About 4 weeks ago she noticed a skin tag outside of her anus.  She reports when she presses it it gets smaller but has purulent drainage coming from within her anus.  Her last colonoscopy was within the past couple months performed by Dr. Dawn.  The ileum was unable to be examined so a CTE was performed which showed terminal ileitis. She has had a previous hemorrhoidectomy     Past Medical History  She has a past medical history of ADHD, Anxiety, Crohn's disease of small intestine in remission (Multi), Diverticulosis, Hypertension, and Migraines.    Surgical History  She has a past surgical history that includes Excisional hemorrhoidectomy (2021).     Social History  She reports that she has never smoked. She has never been exposed to tobacco smoke. She has never used smokeless tobacco. She reports that she does not drink alcohol and does not use drugs.    Family History  No family history on file.     Allergies  Barium sulfate, Lisinopril, Hydrocodone-acetaminophen, Nickel, Other, Pregabalin, Gadolinium-containing contrast media, Iodinated contrast media, and Onion    Review of Systems   Constitutional:  Negative for chills, fever and unexpected weight change.   HENT:  Negative for sneezing, sore throat, trouble swallowing and voice change.    Respiratory:  Negative for chest tightness and shortness of breath.    Cardiovascular:  Negative for chest pain and palpitations.   Gastrointestinal:  Negative for abdominal pain, blood in stool, diarrhea, nausea and vomiting.   Endocrine: Negative for cold intolerance and heat intolerance.   Genitourinary:  " Negative for decreased urine volume, dysuria and hematuria.   Musculoskeletal:  Negative for arthralgias and gait problem.   Skin:  Negative for rash and wound.   Neurological:  Negative for facial asymmetry, speech difficulty and headaches.   Hematological:  Negative for adenopathy. Does not bruise/bleed easily.   Psychiatric/Behavioral:  Negative for self-injury and suicidal ideas.         Physical Exam  Vitals and nursing note reviewed.   Constitutional:       Appearance: Normal appearance.   HENT:      Head: Normocephalic and atraumatic.      Mouth/Throat:      Mouth: Mucous membranes are moist.      Pharynx: Oropharynx is clear.   Eyes:      Extraocular Movements: Extraocular movements intact.      Pupils: Pupils are equal, round, and reactive to light.   Cardiovascular:      Rate and Rhythm: Normal rate and regular rhythm.      Pulses: Normal pulses.   Pulmonary:      Effort: Pulmonary effort is normal.      Breath sounds: Normal breath sounds.   Abdominal:      General: There is no distension.      Palpations: Abdomen is soft.      Tenderness: There is no abdominal tenderness.   Genitourinary:      Musculoskeletal:      Cervical back: Normal range of motion and neck supple.   Skin:     General: Skin is warm and dry.   Neurological:      General: No focal deficit present.      Mental Status: She is alert and oriented to person, place, and time.   Psychiatric:         Mood and Affect: Mood normal.         Behavior: Behavior normal.          Last Recorded Vitals  Blood pressure 140/80, pulse 81, temperature 36.7 °C (98 °F), temperature source Oral, height 1.676 m (5' 6\"), weight 97.1 kg (214 lb), SpO2 99%.       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  Recent workup has shown terminal ileitis.  She also has a new draining lesion around her anus with evidence of inflammation and small fissures within the anal canal.  She is switching medications to treat her Crohn's and is part of a study.  She is not sure what the new medication is called..  We discussed the etiology of perianal fistulas as secondary to Crohn's disease versus crypto glandular disease.  Discussed the initial treatment would be anorectal exam under anesthesia with seton while obtaining better control of her Crohn's with the new medication.  At this point the anal drainage is minimal and relatively not bothersome.  She elects to continue monitoring at this time.  If she has any increased pain or the lesion is unable to drain, will call back to discuss I&D and seton placement.      Berta Ennis MD  Patient ID: Daisha Zelaya \"Justine" is a 39 y.o. female.    Anoscopy    Date/Time: 11/26/2024 10:45 AM    Performed by: Berta Ennis MD  Authorized by: Berta Ennis MD    Consent:     Consent obtained:  Verbal and written    Consent given by:  Patient    Risks, benefits, and alternatives were discussed: yes      Risks discussed:  Bleeding and pain    Alternatives discussed:  No treatment and alternative treatment  Universal protocol:     Procedure explained and questions answered to patient or proxy's satisfaction: yes      Immediately prior to procedure, a time out was called: yes      Patient identity confirmed:  Verbally with patient  Indications:     Indications comment:  Perianal fistula  Procedure details:     Anal fistulae: yes (right anterior)      Tearing: yes      Blood in rectal vault: yes      Bleeding site:  Within small tears in anal canal  Post-procedure details:     Procedure completion:  Tolerated well, no immediate complications    "

## 2024-12-14 DIAGNOSIS — I10 ESSENTIAL HYPERTENSION: ICD-10-CM

## 2024-12-16 RX ORDER — TRIAMTERENE AND HYDROCHLOROTHIAZIDE 75; 50 MG/1; MG/1
1 TABLET ORAL
Qty: 90 TABLET | Refills: 0 | Status: SHIPPED | OUTPATIENT
Start: 2024-12-16

## 2024-12-17 NOTE — TELEPHONE ENCOUNTER
Lm on pt vm notifying pt of rx refill that was sent to pt pharmacy on file. Pt was instructed to give the office an call back to have BP follow up appt scheduled.

## 2025-04-08 DIAGNOSIS — I10 ESSENTIAL HYPERTENSION: ICD-10-CM

## 2025-04-08 DIAGNOSIS — Z76.0 MEDICATION REFILL: ICD-10-CM

## 2025-04-08 RX ORDER — BUSPIRONE HYDROCHLORIDE 15 MG/1
15 TABLET ORAL 2 TIMES DAILY
Qty: 180 TABLET | Refills: 0 | Status: SHIPPED | OUTPATIENT
Start: 2025-04-08

## 2025-04-09 RX ORDER — TRIAMTERENE AND HYDROCHLOROTHIAZIDE 75; 50 MG/1; MG/1
1 TABLET ORAL
Qty: 90 TABLET | Refills: 0 | Status: SHIPPED | OUTPATIENT
Start: 2025-04-09

## 2025-07-15 DIAGNOSIS — I10 ESSENTIAL HYPERTENSION: ICD-10-CM

## 2025-07-16 RX ORDER — TRIAMTERENE AND HYDROCHLOROTHIAZIDE 75; 50 MG/1; MG/1
1 TABLET ORAL
Qty: 90 TABLET | Refills: 0 | OUTPATIENT
Start: 2025-07-16

## 2025-07-21 RX ORDER — TRIAMTERENE AND HYDROCHLOROTHIAZIDE 75; 50 MG/1; MG/1
1 TABLET ORAL
Qty: 90 TABLET | Refills: 0 | Status: SHIPPED | OUTPATIENT
Start: 2025-07-21

## 2025-07-21 NOTE — TELEPHONE ENCOUNTER
Approving.  
MEDICATION NOT PRESCRIBED BY THIS OFFICE OR PROVIDER  
PT REQUESTING REFILL LASIX 40 MG SENT TO [facility]   
PT REQUESTING REFILL TRIAMTERENE HYDROCHLOROTHIAZIDE 75-50 SENT TO [facility]   
PT SCHEDULED 12/2/25 FOR PHYSICAL REQUESTING REFILL LASIX BE SENT   
Patient states Tuesday rash all over and itching really bad, took zyrtec (11am) and benadryl (last night). States her hand feet and lips were swollen. Patient does not currently having swelling to her hands feet or lips. She also endorse \"needle prickling feeling all over\".  
Refill request sent from pharmacy. Last OV  8/28/24 - needs to schedule for physical - rf can be sent to PCP for review once she has appt     
Detail Level: Simple
Additional Notes: Patient consent was obtained to proceed with the visit and recommended plan of care after discussion of all risks and benefits, including the risks of COVID-19 exposure.

## 2025-07-25 DIAGNOSIS — I10 ESSENTIAL HYPERTENSION: ICD-10-CM

## 2025-07-25 RX ORDER — TRIAMTERENE AND HYDROCHLOROTHIAZIDE 75; 50 MG/1; MG/1
1 TABLET ORAL
Qty: 90 TABLET | Refills: 0 | OUTPATIENT
Start: 2025-07-25

## 2025-07-25 NOTE — TELEPHONE ENCOUNTER
Medication populated, but I do not see Lasix on med list, please review and advise. Physical scheduled for 12/2/25.

## 2025-08-12 ENCOUNTER — TELEMEDICINE (OUTPATIENT)
Dept: PRIMARY CARE | Facility: CLINIC | Age: 40
End: 2025-08-12
Payer: COMMERCIAL

## 2025-08-12 VITALS — TEMPERATURE: 99.7 F

## 2025-08-12 DIAGNOSIS — R11.14 BILIOUS VOMITING WITH NAUSEA: Primary | ICD-10-CM

## 2025-08-12 DIAGNOSIS — K60.30 PERIANAL FISTULA DUE TO CROHN'S DISEASE: ICD-10-CM

## 2025-08-12 DIAGNOSIS — K50.913 PERIANAL FISTULA DUE TO CROHN'S DISEASE: ICD-10-CM

## 2025-08-12 DIAGNOSIS — K50.018 CROHN'S DISEASE OF SMALL INTESTINE WITH OTHER COMPLICATION: ICD-10-CM

## 2025-08-12 DIAGNOSIS — K50.819 CROHN'S DISEASE OF SMALL AND LARGE INTESTINES WITH COMPLICATION (MULTI): ICD-10-CM

## 2025-08-12 PROCEDURE — 99214 OFFICE O/P EST MOD 30 MIN: CPT | Performed by: NURSE PRACTITIONER

## 2025-08-12 PROCEDURE — 1036F TOBACCO NON-USER: CPT | Performed by: NURSE PRACTITIONER

## 2025-08-12 RX ORDER — RISANKIZUMAB-RZAA 360 MG/2.4
360 WEARABLE INJECTOR SUBCUTANEOUS ONCE
COMMUNITY

## 2025-08-12 RX ORDER — FUROSEMIDE 40 MG/1
TABLET ORAL
COMMUNITY

## 2025-08-12 ASSESSMENT — LIFESTYLE VARIABLES
HOW MANY STANDARD DRINKS CONTAINING ALCOHOL DO YOU HAVE ON A TYPICAL DAY: 1 OR 2
HOW OFTEN DO YOU HAVE A DRINK CONTAINING ALCOHOL: MONTHLY OR LESS
HOW OFTEN DO YOU HAVE SIX OR MORE DRINKS ON ONE OCCASION: NEVER
SKIP TO QUESTIONS 9-10: 1
AUDIT-C TOTAL SCORE: 1

## 2025-08-12 ASSESSMENT — PATIENT HEALTH QUESTIONNAIRE - PHQ9
2. FEELING DOWN, DEPRESSED OR HOPELESS: NOT AT ALL
1. LITTLE INTEREST OR PLEASURE IN DOING THINGS: NOT AT ALL
SUM OF ALL RESPONSES TO PHQ9 QUESTIONS 1 AND 2: 0

## 2025-08-12 ASSESSMENT — PAIN SCALES - GENERAL: PAINLEVEL_OUTOF10: 8
